# Patient Record
Sex: MALE | Race: WHITE | NOT HISPANIC OR LATINO | URBAN - METROPOLITAN AREA
[De-identification: names, ages, dates, MRNs, and addresses within clinical notes are randomized per-mention and may not be internally consistent; named-entity substitution may affect disease eponyms.]

---

## 2018-07-17 ENCOUNTER — EMERGENCY (EMERGENCY)
Facility: HOSPITAL | Age: 83
LOS: 0 days | Discharge: HOME | End: 2018-07-17
Attending: EMERGENCY MEDICINE | Admitting: EMERGENCY MEDICINE

## 2018-07-17 VITALS
TEMPERATURE: 96 F | SYSTOLIC BLOOD PRESSURE: 146 MMHG | RESPIRATION RATE: 18 BRPM | DIASTOLIC BLOOD PRESSURE: 72 MMHG | HEART RATE: 116 BPM | OXYGEN SATURATION: 96 %

## 2018-07-17 VITALS
TEMPERATURE: 98 F | HEART RATE: 85 BPM | RESPIRATION RATE: 18 BRPM | DIASTOLIC BLOOD PRESSURE: 59 MMHG | OXYGEN SATURATION: 98 % | SYSTOLIC BLOOD PRESSURE: 116 MMHG

## 2018-07-17 DIAGNOSIS — E11.9 TYPE 2 DIABETES MELLITUS WITHOUT COMPLICATIONS: ICD-10-CM

## 2018-07-17 DIAGNOSIS — I10 ESSENTIAL (PRIMARY) HYPERTENSION: ICD-10-CM

## 2018-07-17 DIAGNOSIS — R04.0 EPISTAXIS: ICD-10-CM

## 2018-07-17 DIAGNOSIS — R00.0 TACHYCARDIA, UNSPECIFIED: ICD-10-CM

## 2018-07-17 LAB
ANION GAP SERPL CALC-SCNC: 18 MMOL/L — HIGH (ref 7–14)
APTT BLD: 27.9 SEC — SIGNIFICANT CHANGE UP (ref 27–39.2)
BUN SERPL-MCNC: 29 MG/DL — HIGH (ref 10–20)
CALCIUM SERPL-MCNC: 8.7 MG/DL — SIGNIFICANT CHANGE UP (ref 8.5–10.1)
CHLORIDE SERPL-SCNC: 100 MMOL/L — SIGNIFICANT CHANGE UP (ref 98–110)
CO2 SERPL-SCNC: 22 MMOL/L — SIGNIFICANT CHANGE UP (ref 17–32)
CREAT SERPL-MCNC: 1.1 MG/DL — SIGNIFICANT CHANGE UP (ref 0.7–1.5)
GLUCOSE SERPL-MCNC: 165 MG/DL — HIGH (ref 70–99)
HCT VFR BLD CALC: 43.8 % — SIGNIFICANT CHANGE UP (ref 42–52)
HGB BLD-MCNC: 14.1 G/DL — SIGNIFICANT CHANGE UP (ref 14–18)
INR BLD: 1.15 RATIO — SIGNIFICANT CHANGE UP (ref 0.65–1.3)
MCHC RBC-ENTMCNC: 31 PG — SIGNIFICANT CHANGE UP (ref 27–31)
MCHC RBC-ENTMCNC: 32.2 G/DL — SIGNIFICANT CHANGE UP (ref 32–37)
MCV RBC AUTO: 96.3 FL — HIGH (ref 80–94)
NRBC # BLD: 0 /100 WBCS — SIGNIFICANT CHANGE UP (ref 0–0)
PLATELET # BLD AUTO: 182 K/UL — SIGNIFICANT CHANGE UP (ref 130–400)
POTASSIUM SERPL-MCNC: 4.8 MMOL/L — SIGNIFICANT CHANGE UP (ref 3.5–5)
POTASSIUM SERPL-SCNC: 4.8 MMOL/L — SIGNIFICANT CHANGE UP (ref 3.5–5)
PROTHROM AB SERPL-ACNC: 12.4 SEC — SIGNIFICANT CHANGE UP (ref 9.95–12.87)
RBC # BLD: 4.55 M/UL — LOW (ref 4.7–6.1)
RBC # FLD: 14.8 % — HIGH (ref 11.5–14.5)
SODIUM SERPL-SCNC: 140 MMOL/L — SIGNIFICANT CHANGE UP (ref 135–146)
WBC # BLD: 10.7 K/UL — SIGNIFICANT CHANGE UP (ref 4.8–10.8)
WBC # FLD AUTO: 10.7 K/UL — SIGNIFICANT CHANGE UP (ref 4.8–10.8)

## 2018-07-17 RX ORDER — SODIUM CHLORIDE 9 MG/ML
500 INJECTION INTRAMUSCULAR; INTRAVENOUS; SUBCUTANEOUS ONCE
Qty: 0 | Refills: 0 | Status: COMPLETED | OUTPATIENT
Start: 2018-07-17 | End: 2018-07-17

## 2018-07-17 RX ORDER — SODIUM CHLORIDE 9 MG/ML
3 INJECTION INTRAMUSCULAR; INTRAVENOUS; SUBCUTANEOUS EVERY 8 HOURS
Qty: 0 | Refills: 0 | Status: DISCONTINUED | OUTPATIENT
Start: 2018-07-17 | End: 2018-07-17

## 2018-07-17 RX ADMIN — SODIUM CHLORIDE 1000 MILLILITER(S): 9 INJECTION INTRAMUSCULAR; INTRAVENOUS; SUBCUTANEOUS at 19:31

## 2018-07-17 RX ADMIN — SODIUM CHLORIDE 3 MILLILITER(S): 9 INJECTION INTRAMUSCULAR; INTRAVENOUS; SUBCUTANEOUS at 21:10

## 2018-07-17 NOTE — ED PROVIDER NOTE - PHYSICAL EXAMINATION
CONST: Well appearing in NAD  EYES: PERRL, EOMI, Sclera and conjunctiva clear.   ENT: No active bleeding but fresh clots in left nare. No bleeding post OP  NECK: Non-tender, no meningeal signs  CARD: Normal S1 S2; Normal rate and rhythm  RESP: Equal BS B/L, No wheezes, rhonchi or rales. No distress  GI: Soft, non-tender, non-distended.  MS: Normal ROM in all extremities. No midline spinal tenderness.  SKIN: Warm, dry, no acute rashes. Good turgor  NEURO: A&Ox3, No focal deficits. Strength 5/5 with no sensory deficits. Steady gait

## 2018-07-17 NOTE — ED PROVIDER NOTE - NS ED ROS FT
CONST: No fever, chills or bodyaches  EYES: No pain, redness, drainage or visual changes.  CARD: No chest pain, palpitations  RESP: No SOB, cough, hemoptysis. No hx of asthma or COPD  GI: No abdominal pain, N/V/D  MS: No joint pain, back pain or extremity pain/injury  SKIN: No rashes  NEURO: No headache, dizziness, paresthesias or LOC

## 2018-07-17 NOTE — ED PROVIDER NOTE - ATTENDING CONTRIBUTION TO CARE
83M PMG dm, htn, ASA daily, p/w epistaxis. Pt had impacted infected molar removed today L sided by oral surgeon and was told would have possible epistaxis after due to sinus involvement of infection. epistaxis resolved in ED after pressure. no dizziness, cp, sob, palp, LOC. No abnl bruising, bleeding anywhere else. no fever, chills, n/v. no ha, numbness, weakness. no blood thinners. on exam, AFVSS, well deon nad, ncat, eomi, perrla, mmm, dried blood in L nare, no blood in post OP, lctab, rrr nl s1s2 no mrg, abd soft ntnd, aaox3, no focal deficits, no le edema or calf ttp, a/p; Epistaxis s/p dental procedure, pt was tachycardic on arrival on ASA, will check cbc, lytes, and get dental consult, bleeding resolved.

## 2018-07-17 NOTE — CONSULT NOTE ADULT - SUBJECTIVE AND OBJECTIVE BOX
Patient is a 83y old  Male who presents with a chief complaint of a nose bleed    HPI: Extraction of upper left molar completed at 14:00 by a private dentist, patient stated he started bleeding from his nose and was told to come to the hospital for evaluation.      PAST MEDICAL & SURGICAL HISTORY:  HTN (hypertension)    (   ) heart valve replacement  (   ) joint replacement  (   ) pregnancy    MEDICATIONS  (STANDING):  sodium chloride 0.9% Bolus 500 milliLiter(s) IV Bolus once  sodium chloride 0.9% lock flush 3 milliLiter(s) IV Push every 8 hours    MEDICATIONS  (PRN):      REVIEW OF SYSTEMS      General:	    Skin/Breast:  	  Ophthalmologic:  	  ENMT:	    Respiratory and Thorax:  	  Cardiovascular:	    Gastrointestinal:	    Genitourinary:	    Musculoskeletal:	    Neurological:	    Psychiatric:	    Hematology/Lymphatics:	    Endocrine:	    Allergic/Immunologic:	    Allergies    No Known Allergies    Intolerances        FAMILY HISTORY:      *SOCIAL HISTORY: (   ) Tobacco; (   ) ETOH    Vital Signs Last 24 Hrs  T(C): 35.8 (17 Jul 2018 18:34), Max: 35.8 (17 Jul 2018 18:34)  T(F): 96.4 (17 Jul 2018 18:34), Max: 96.4 (17 Jul 2018 18:34)  HR: 116 (17 Jul 2018 18:34) (116 - 116)  BP: 146/72 (17 Jul 2018 18:34) (146/72 - 146/72)  BP(mean): --  RR: 18 (17 Jul 2018 18:34) (18 - 18)  SpO2: 96% (17 Jul 2018 18:34) (96% - 96%)    LABS:                  Last Dental Visit: << 7/17/2018 >>    EOE:  TMJ (-   ) clicks                     ( -  ) pops                     (  - ) crepitus             Mandible <<FROM>>             Facial bones and MOM <<grossly intact>>             (  - ) trismus             (   -) lymphadenopathy             (  - ) swelling             (  - ) asymmetry             (  - ) palpation             (  - ) dyspnea             (  - ) dysphagia             (  - ) loss of consciousness    IOE:  <<permanent>> dentition:            <<multiple missing teeth>>             Dentition Present << Edentulous >>                     Mobility <<None  >>                     Caries <<No caries noted  >>                hard/soft palate:  (   ) palatal torus, <<No pathology noted>>            tongue/FOM <<No pathology noted>>            labial/buccal mucosa <<No pathology noted>>           (  - ) percussion           (   -) palpation           (  - ) swelling            (  - ) abscess           (  - ) sinus tract    *DENTAL RADIOGRAPHS:None    RADIOLOGY & ADDITIONAL STUDIES:None    *ASSESSMENT: Extraoral exam shows patient is no longer bleeding from nose with no abnormal findings. Intraoral exam shows extraction site of left Maxillary Molar area sutured appropriately. No palpation sensitivity in the area. Possible perforation of the Maxillary Sinus when extraction of Maxillary Left Molar was completed could cause the nose bleed.     *PLAN: Patient is currently stable and would recommend antibiotic treatment.    PROCEDURE:   Verbal and written consent given.  Anesthesia: <<  None  >>   Treatment: <<Extraoral/Intraoral Exam    >>     RECOMMENDATIONS:  1) <<Augmentin and Afrin Nasal Spray; Follow-up with Oral Surgeon who performed tooth extraction   >>  2) Dental F/U with outpatient dentist for comprehensive dental care.   3) If any difficulty swallowing/breathing, fever occur, return to ER.  Diallo Cole DDS, [facility] [phone]

## 2018-07-17 NOTE — ED PROVIDER NOTE - MEDICAL DECISION MAKING DETAILS
dental evaluated, ok for outpt f/u, bleeding stopped, labs wnl, repeat vitals wnl, pt feels well  will dc home w outpt f/u oral surgeon this week, strict return precautions provided.

## 2019-08-22 PROBLEM — I10 ESSENTIAL (PRIMARY) HYPERTENSION: Chronic | Status: ACTIVE | Noted: 2018-07-17

## 2019-09-10 NOTE — ASU PATIENT PROFILE, ADULT - PMH
Abnormal cholesterol test    Acquired cataract    History of diabetic neuropathy  IDDM  HTN (hypertension)    Kidney stones

## 2019-09-11 ENCOUNTER — OUTPATIENT (OUTPATIENT)
Dept: OUTPATIENT SERVICES | Facility: HOSPITAL | Age: 84
LOS: 1 days | Discharge: HOME | End: 2019-09-11

## 2019-09-11 VITALS — SYSTOLIC BLOOD PRESSURE: 140 MMHG | RESPIRATION RATE: 17 BRPM | DIASTOLIC BLOOD PRESSURE: 74 MMHG | HEART RATE: 67 BPM

## 2019-09-11 VITALS
HEIGHT: 69 IN | HEART RATE: 71 BPM | DIASTOLIC BLOOD PRESSURE: 70 MMHG | SYSTOLIC BLOOD PRESSURE: 147 MMHG | TEMPERATURE: 97 F | WEIGHT: 175.05 LBS | OXYGEN SATURATION: 99 % | RESPIRATION RATE: 17 BRPM

## 2019-09-11 DIAGNOSIS — Z98.890 OTHER SPECIFIED POSTPROCEDURAL STATES: Chronic | ICD-10-CM

## 2019-09-11 LAB
GLUCOSE BLDC GLUCOMTR-MCNC: 70 MG/DL — SIGNIFICANT CHANGE UP (ref 70–99)
GLUCOSE BLDC GLUCOMTR-MCNC: 86 MG/DL — SIGNIFICANT CHANGE UP (ref 70–99)
GLUCOSE BLDC GLUCOMTR-MCNC: 88 MG/DL — SIGNIFICANT CHANGE UP (ref 70–99)

## 2019-09-11 RX ORDER — SODIUM CHLORIDE 9 MG/ML
3 INJECTION INTRAMUSCULAR; INTRAVENOUS; SUBCUTANEOUS ONCE
Refills: 0 | Status: DISCONTINUED | OUTPATIENT
Start: 2019-09-11 | End: 2019-09-11

## 2019-09-11 RX ORDER — SIMVASTATIN 20 MG/1
1 TABLET, FILM COATED ORAL
Qty: 0 | Refills: 0 | DISCHARGE

## 2019-09-11 RX ORDER — METFORMIN HYDROCHLORIDE 850 MG/1
1 TABLET ORAL
Qty: 0 | Refills: 0 | DISCHARGE

## 2019-09-11 RX ORDER — SODIUM CHLORIDE 9 MG/ML
1000 INJECTION, SOLUTION INTRAVENOUS
Refills: 0 | Status: DISCONTINUED | OUTPATIENT
Start: 2019-09-11 | End: 2019-09-11

## 2019-09-11 RX ORDER — SEMAGLUTIDE 0.68 MG/ML
0 INJECTION, SOLUTION SUBCUTANEOUS
Qty: 0 | Refills: 0 | DISCHARGE

## 2019-09-11 RX ORDER — DAPAGLIFLOZIN 10 MG/1
1 TABLET, FILM COATED ORAL
Qty: 0 | Refills: 0 | DISCHARGE

## 2019-09-11 RX ORDER — IRBESARTAN 75 MG/1
1 TABLET ORAL
Qty: 0 | Refills: 0 | DISCHARGE

## 2019-09-11 RX ORDER — LEVOTHYROXINE SODIUM 125 MCG
1 TABLET ORAL
Qty: 0 | Refills: 0 | DISCHARGE

## 2019-09-11 RX ORDER — INSULIN DEGLUDEC 100 U/ML
0 INJECTION, SOLUTION SUBCUTANEOUS
Qty: 0 | Refills: 0 | DISCHARGE

## 2019-09-11 RX ORDER — ASPIRIN/CALCIUM CARB/MAGNESIUM 324 MG
1 TABLET ORAL
Qty: 0 | Refills: 0 | DISCHARGE

## 2019-09-11 NOTE — CHART NOTE - NSCHARTNOTEFT_GEN_A_CORE
PACU ANESTHESIA ADMISSION NOTE      Procedure:   Post op diagnosis:      ____  Intubated  TV:______       Rate: ______      FiO2: ______    __x__  Patent Airway    __x__  Full return of protective reflexes    _x___  Full recovery from anesthesia / back to baseline status    Vitals:  T(C): 36.2 (09-11-19 @ 12:34), Max: 36.2 (09-11-19 @ 11:12)  HR: 71 (09-11-19 @ 12:34) (71 - 71)  BP: 147/70 (09-11-19 @ 12:34) (147/70 - 147/70)  RR: 17 (09-11-19 @ 12:34) (17 - 17)  SpO2: 99% (09-11-19 @ 12:34) (99% - 99%)    Mental Status:  ___x_ Awake   ___x__ Alert   _____ Drowsy   _____ Sedated    Nausea/Vomiting:  ____ NO  __x____Yes,   See Post - Op Orders          Pain Scale (0-10):  _____    Treatment: ____ None    _x___ See Post - Op/PCA Orders    Post - Operative Fluids:   ____ Oral   _x___ See Post - Op Orders    Plan: Discharge:   _x__Home       _____Floor     _____Critical Care    _____  Other:_________________    Comments: uneventful anesthesia course no complications. VItals stable. Pt transferred to PACUx

## 2019-09-11 NOTE — ASU PREOP CHECKLIST - SELECT TESTS ORDERED
POCT Blood Glucose/fs = 70 at 1045... repeat at 1128 =/Sickle Cell (black patients 3mos-10yr) Sickle Cell (black patients 3mos-10yr)/fs = 70 at 1045... repeat at 1128 = 88/POCT Blood Glucose

## 2019-09-11 NOTE — PRE-ANESTHESIA EVALUATION ADULT - NSANTHPMHFT_GEN_ALL_CORE
Chart reviewed, pt interviewed and examined.  DM, took 15u Insulin am today, FS 70mg/dl, had  iv D5W, repeat FS 88mg/dl.

## 2019-09-30 DIAGNOSIS — H25.12 AGE-RELATED NUCLEAR CATARACT, LEFT EYE: ICD-10-CM

## 2019-09-30 DIAGNOSIS — Z79.84 LONG TERM (CURRENT) USE OF ORAL HYPOGLYCEMIC DRUGS: ICD-10-CM

## 2019-09-30 DIAGNOSIS — Z79.82 LONG TERM (CURRENT) USE OF ASPIRIN: ICD-10-CM

## 2019-09-30 DIAGNOSIS — E11.9 TYPE 2 DIABETES MELLITUS WITHOUT COMPLICATIONS: ICD-10-CM

## 2019-09-30 DIAGNOSIS — I10 ESSENTIAL (PRIMARY) HYPERTENSION: ICD-10-CM

## 2021-08-28 ENCOUNTER — EMERGENCY (EMERGENCY)
Facility: HOSPITAL | Age: 86
LOS: 0 days | Discharge: HOME | End: 2021-08-28
Attending: EMERGENCY MEDICINE | Admitting: EMERGENCY MEDICINE
Payer: MEDICARE

## 2021-08-28 VITALS
TEMPERATURE: 98 F | SYSTOLIC BLOOD PRESSURE: 139 MMHG | RESPIRATION RATE: 16 BRPM | HEIGHT: 69 IN | WEIGHT: 174.83 LBS | HEART RATE: 91 BPM | DIASTOLIC BLOOD PRESSURE: 68 MMHG | OXYGEN SATURATION: 98 %

## 2021-08-28 DIAGNOSIS — Z98.890 OTHER SPECIFIED POSTPROCEDURAL STATES: Chronic | ICD-10-CM

## 2021-08-28 DIAGNOSIS — Z79.890 HORMONE REPLACEMENT THERAPY: ICD-10-CM

## 2021-08-28 DIAGNOSIS — I10 ESSENTIAL (PRIMARY) HYPERTENSION: ICD-10-CM

## 2021-08-28 DIAGNOSIS — Z87.442 PERSONAL HISTORY OF URINARY CALCULI: ICD-10-CM

## 2021-08-28 DIAGNOSIS — M79.661 PAIN IN RIGHT LOWER LEG: ICD-10-CM

## 2021-08-28 DIAGNOSIS — Z79.82 LONG TERM (CURRENT) USE OF ASPIRIN: ICD-10-CM

## 2021-08-28 DIAGNOSIS — Z79.84 LONG TERM (CURRENT) USE OF ORAL HYPOGLYCEMIC DRUGS: ICD-10-CM

## 2021-08-28 DIAGNOSIS — E11.40 TYPE 2 DIABETES MELLITUS WITH DIABETIC NEUROPATHY, UNSPECIFIED: ICD-10-CM

## 2021-08-28 PROBLEM — Z86.39 PERSONAL HISTORY OF OTHER ENDOCRINE, NUTRITIONAL AND METABOLIC DISEASE: Chronic | Status: ACTIVE | Noted: 2019-09-11

## 2021-08-28 PROBLEM — H26.9 UNSPECIFIED CATARACT: Chronic | Status: ACTIVE | Noted: 2019-09-11

## 2021-08-28 PROBLEM — N20.0 CALCULUS OF KIDNEY: Chronic | Status: ACTIVE | Noted: 2019-09-11

## 2021-08-28 PROBLEM — E78.9 DISORDER OF LIPOPROTEIN METABOLISM, UNSPECIFIED: Chronic | Status: ACTIVE | Noted: 2019-09-11

## 2021-08-28 LAB
ALBUMIN SERPL ELPH-MCNC: 4.1 G/DL — SIGNIFICANT CHANGE UP (ref 3.5–5.2)
ALP SERPL-CCNC: 75 U/L — SIGNIFICANT CHANGE UP (ref 30–115)
ALT FLD-CCNC: 12 U/L — SIGNIFICANT CHANGE UP (ref 0–41)
ANION GAP SERPL CALC-SCNC: 9 MMOL/L — SIGNIFICANT CHANGE UP (ref 7–14)
APTT BLD: 34 SEC — SIGNIFICANT CHANGE UP (ref 27–39.2)
AST SERPL-CCNC: 15 U/L — SIGNIFICANT CHANGE UP (ref 0–41)
BASOPHILS # BLD AUTO: 0.05 K/UL — SIGNIFICANT CHANGE UP (ref 0–0.2)
BASOPHILS NFR BLD AUTO: 0.6 % — SIGNIFICANT CHANGE UP (ref 0–1)
BILIRUB SERPL-MCNC: 0.5 MG/DL — SIGNIFICANT CHANGE UP (ref 0.2–1.2)
BUN SERPL-MCNC: 20 MG/DL — SIGNIFICANT CHANGE UP (ref 10–20)
CALCIUM SERPL-MCNC: 9.4 MG/DL — SIGNIFICANT CHANGE UP (ref 8.5–10.1)
CHLORIDE SERPL-SCNC: 102 MMOL/L — SIGNIFICANT CHANGE UP (ref 98–110)
CO2 SERPL-SCNC: 28 MMOL/L — SIGNIFICANT CHANGE UP (ref 17–32)
CREAT SERPL-MCNC: 1 MG/DL — SIGNIFICANT CHANGE UP (ref 0.7–1.5)
EOSINOPHIL # BLD AUTO: 0.17 K/UL — SIGNIFICANT CHANGE UP (ref 0–0.7)
EOSINOPHIL NFR BLD AUTO: 2 % — SIGNIFICANT CHANGE UP (ref 0–8)
GLUCOSE SERPL-MCNC: 81 MG/DL — SIGNIFICANT CHANGE UP (ref 70–99)
HCT VFR BLD CALC: 44.7 % — SIGNIFICANT CHANGE UP (ref 42–52)
HGB BLD-MCNC: 14.8 G/DL — SIGNIFICANT CHANGE UP (ref 14–18)
IMM GRANULOCYTES NFR BLD AUTO: 0.5 % — HIGH (ref 0.1–0.3)
INR BLD: 1.04 RATIO — SIGNIFICANT CHANGE UP (ref 0.65–1.3)
LYMPHOCYTES # BLD AUTO: 1.9 K/UL — SIGNIFICANT CHANGE UP (ref 1.2–3.4)
LYMPHOCYTES # BLD AUTO: 22.8 % — SIGNIFICANT CHANGE UP (ref 20.5–51.1)
MCHC RBC-ENTMCNC: 31.6 PG — HIGH (ref 27–31)
MCHC RBC-ENTMCNC: 33.1 G/DL — SIGNIFICANT CHANGE UP (ref 32–37)
MCV RBC AUTO: 95.5 FL — HIGH (ref 80–94)
MONOCYTES # BLD AUTO: 1.04 K/UL — HIGH (ref 0.1–0.6)
MONOCYTES NFR BLD AUTO: 12.5 % — HIGH (ref 1.7–9.3)
NEUTROPHILS # BLD AUTO: 5.12 K/UL — SIGNIFICANT CHANGE UP (ref 1.4–6.5)
NEUTROPHILS NFR BLD AUTO: 61.6 % — SIGNIFICANT CHANGE UP (ref 42.2–75.2)
NRBC # BLD: 0 /100 WBCS — SIGNIFICANT CHANGE UP (ref 0–0)
PLATELET # BLD AUTO: 219 K/UL — SIGNIFICANT CHANGE UP (ref 130–400)
POTASSIUM SERPL-MCNC: 4.5 MMOL/L — SIGNIFICANT CHANGE UP (ref 3.5–5)
POTASSIUM SERPL-SCNC: 4.5 MMOL/L — SIGNIFICANT CHANGE UP (ref 3.5–5)
PROT SERPL-MCNC: 6.8 G/DL — SIGNIFICANT CHANGE UP (ref 6–8)
PROTHROM AB SERPL-ACNC: 11.9 SEC — SIGNIFICANT CHANGE UP (ref 9.95–12.87)
RBC # BLD: 4.68 M/UL — LOW (ref 4.7–6.1)
RBC # FLD: 16.1 % — HIGH (ref 11.5–14.5)
SODIUM SERPL-SCNC: 139 MMOL/L — SIGNIFICANT CHANGE UP (ref 135–146)
WBC # BLD: 8.32 K/UL — SIGNIFICANT CHANGE UP (ref 4.8–10.8)
WBC # FLD AUTO: 8.32 K/UL — SIGNIFICANT CHANGE UP (ref 4.8–10.8)

## 2021-08-28 PROCEDURE — 93925 LOWER EXTREMITY STUDY: CPT | Mod: 26

## 2021-08-28 PROCEDURE — 99284 EMERGENCY DEPT VISIT MOD MDM: CPT

## 2021-08-28 RX ORDER — GABAPENTIN 400 MG/1
1 CAPSULE ORAL
Qty: 90 | Refills: 0
Start: 2021-08-28 | End: 2021-09-26

## 2021-08-28 NOTE — ED ADULT TRIAGE NOTE - CHIEF COMPLAINT QUOTE
Pt c/o right foot pain x2 weeks, worsening overnight & waking him from sleep; sent in from urgent care for a weak pulse. Pt reports he has sensation, but feels tingling.

## 2021-08-28 NOTE — ED PROVIDER NOTE - ATTENDING CONTRIBUTION TO CARE
85 yo m with pmh of htn, iddm, sent by [facility] for R foot pain.  as per document, pt had decreased pulse of the RLE so sent in to ED for evaluation.  pt admits has been having RLE/R foot pain worse at night for the last few months.  however over the last few weeks, he is also having pain during the day and during activity.  denies trauma.  exam: nad, ncat, perrl, eomi, mmm, rrr, ctab, abd soft, nt,nd aox3, b/l LE and feet warm, nonpalpable pulses, dopplerable R PT, FROM all joints, nontender LE imp: pt with R foot/RLE pain at rest and during activity, ?claudication, unable to palpate pulses, will send for arterial duplex and vascular consult

## 2021-08-28 NOTE — ED PROVIDER NOTE - CARE PROVIDER_API CALL
Hanna Andrea (MD)  Surgery  [address] [place] [address]  Phone: [phone]  Fax: [phone]  Follow Up Time: Urgent

## 2021-08-28 NOTE — ED PROVIDER NOTE - PROGRESS NOTE DETAILS
dc: spoke with vascular consultant at [phone]. Will evaluate pt for deminished peripheral pulses. Arterial duplex studies have been ordered. Pt comfortable, VSS, feet warm with full active and passive movment. dc: preoperative labs obtained. Pt to be dc'd with vascular outpt f/u monday plan for angiogram dc: spoke with vascular consultant at [phone]. Will evaluate pt for deminished peripheral pulses. Arterial duplex studies have been ordered. Pt comfortable, VSS, feet warm with full active and passive movement.

## 2021-08-28 NOTE — ED PROVIDER NOTE - OBJECTIVE STATEMENT
86 y.o. M, HTN, Kidney stones sent in by Dr. Wheeler for evaluation of RLE pain.  Is on Lyrica for neuropathy. Pain is worsened at night. No trauma, swallowing, redness, bruising, lacerations. Sent in specifically for concern of diminished pulses. No hx of DVT or PEs.

## 2021-08-28 NOTE — CONSULT NOTE ADULT - SUBJECTIVE AND OBJECTIVE BOX
VASCULAR SURGERY CONSULT NOTE      HPI: Patient is an 87 y/o Male with PMH Of abnormal cholesterol test, acquired cataract, hx of diabetic neuropathy (IDDM), HTN, and kidney stones sent to ED by [facility] for diminished RLE pulses and increased pain. Patient fell about 2 months ago on right side and had a fractured rib. Pain in leg started at that time and was initially only present at night and on activity but has progressed to constant pain even during rest. Patient states that pain is sharp and not burning and travels up and down leg but is not present in the back. Pain is only present in the right leg and not left. Has had no vascular procedures in the past or any ulcers or lower extremity issues besides diabetic neuropathy. Patient denies any chest pain, SOB, or any other concerning problems.         PAST MEDICAL & SURGICAL HISTORY:  HTN (hypertension)    History of diabetic neuropathy  IDDM    Acquired cataract    Abnormal cholesterol test    Kidney stones    History of surgery on arm  ORIF LEFT ARM      No Known Allergies    Home Medications:  Aspir 81 oral delayed release tablet: 1 tab(s) orally once a day (11 Sep 2019 11:35)  Avapro 75 mg oral tablet: 1 tab(s) orally once a day (11 Sep 2019 11:35)  Farxiga 5 mg oral tablet: 1 tab(s) orally once a day (11 Sep 2019 11:35)  irbesartan 75 mg oral tablet: 1 tab(s) orally once a day (11 Sep 2019 11:35)  Lyrica 20 mg/mL oral solution: 2.5 milliliter(s) orally 3 times a day (11 Sep 2019 11:35)  metFORMIN 500 mg oral tablet: 1 tab(s) orally 2 times a day (11 Sep 2019 11:35)  Ozempic (0.25 mg or 0.5 mg dose) 2 mg/1.5 mL subcutaneous solution:  (11 Sep 2019 11:35)  simvastatin 5 mg oral tablet: 1 tab(s) orally once a day (at bedtime) (11 Sep 2019 11:35)  Synthroid 25 mcg (0.025 mg) oral tablet: 1 tab(s) orally once a day (11 Sep 2019 11:35)  Tresiba 100 units/mL subcutaneous solution: 1/2 DOSE  (11 Sep 2019 11:35)    No permtinent family history of PVD    REVIEW OF SYSTEMS:  GENERAL:                                         negative  SKIN:                                                 negative  OPTHALMOLOGIC:                          negative  ENMT:                                               negative  RESPIRATORY AND THORAX:        negative  CARDIOVASCULAR:                         negative  GASTROINTESTINAL:                       negative  NEPHROLOGY:                                  negative  MUSCULOSKELETAL:                       negative  NEUROLOGIC:                                   negative  PSYCHIATRIC:                                    negative  HEMATOLOGY/LYMPHATICS:         negative  ENDOCRINE:                                     negative  ALLERGIC/IMMUNOLOGIC:            negative    12 point ROS otherwise normal except as stated in HPI    PHYSICAL EXAM  Vital Signs Last 24 Hrs  T(C): 36.5 (28 Aug 2021 11:13), Max: 36.5 (28 Aug 2021 11:13)  T(F): 97.7 (28 Aug 2021 11:13), Max: 97.7 (28 Aug 2021 11:13)  HR: 91 (28 Aug 2021 11:13) (91 - 91)  BP: 139/68 (28 Aug 2021 11:13) (139/68 - 139/68)  BP(mean): --  RR: 16 (28 Aug 2021 11:13) (16 - 16)  SpO2: 98% (28 Aug 2021 11:13) (98% - 98%)    Appearance: Normal	  HEENT:   Normal oral mucosa, PERRL, EOMI	  Neck: Supple, - JVD; Carotid Bruit   Cardiovascular: Normal S1 S2, No JVD, No murmurs,   Respiratory: Lungs clear to auscultation, No Rales, Rhonchi, Wheezing	  Gastrointestinal:  Soft, Non-tender, positive BS	  Skin: No rashes, No ecchymoses, No cyanosis  Extremities: Normal range of motion, No clubbing, cyanosis or edema  Vascular: Diminished RLE DP pulse (not palpable but very faint monophasic pulse on doppler). Nontender to palpation of right foot or right calf.  LLE pulses palpable.         PULSES:    Popliteal: palpable   Dorsal Pedal: Palpable on left. Very faint on doppler on right.   Posterior Tibial: Palpable on left. Dopplerable on right.   Capillary: greater than 2 seconds on right foot.     MEDICATIONS:   MEDICATIONS  (STANDING):    MEDICATIONS  (PRN):      LAB/STUDIES:                                IMAGING:        ASSESSMENT: Patient is an 87 y/o male with hx of acquired cataract, hx of diabetic neuropathy (IDDM), HTN, kidney stones and abnormal cholesterol test presented with diminished pulses and constant pain on   Vascular surgery consulted for    PLAN:   -   -   -   -   - Patient seen/examined or Plan Discussed with Fellow,    - Plan to be discussed with Attending,        VASCULAR TEAM SPECTRA: 4113 VASCULAR SURGERY CONSULT NOTE      HPI: Patient is an 87 y/o Male with PMH Of abnormal cholesterol test, acquired cataract, hx of diabetic neuropathy (IDDM), HTN, and kidney stones sent to ED by [facility] for diminished RLE pulses and increased pain. Patient fell about 2 months ago on right side and had a fractured rib. Pain in leg started at that time and was initially only present at night and on activity but has progressed to constant pain even during rest. Patient states that pain is sharp and not burning and travels up and down leg but is not present in the back. Pain is only present in the right leg and not left. Has had no vascular procedures in the past or any ulcers or lower extremity issues besides diabetic neuropathy. Patient denies any chest pain, SOB, or any other concerning problems.         PAST MEDICAL & SURGICAL HISTORY:  HTN (hypertension)    History of diabetic neuropathy  IDDM    Acquired cataract    Abnormal cholesterol test    Kidney stones    History of surgery on arm  ORIF LEFT ARM      No Known Allergies    Home Medications:  Aspir 81 oral delayed release tablet: 1 tab(s) orally once a day (11 Sep 2019 11:35)  Avapro 75 mg oral tablet: 1 tab(s) orally once a day (11 Sep 2019 11:35)  Farxiga 5 mg oral tablet: 1 tab(s) orally once a day (11 Sep 2019 11:35)  irbesartan 75 mg oral tablet: 1 tab(s) orally once a day (11 Sep 2019 11:35)  Lyrica 20 mg/mL oral solution: 2.5 milliliter(s) orally 3 times a day (11 Sep 2019 11:35)  metFORMIN 500 mg oral tablet: 1 tab(s) orally 2 times a day (11 Sep 2019 11:35)  Ozempic (0.25 mg or 0.5 mg dose) 2 mg/1.5 mL subcutaneous solution:  (11 Sep 2019 11:35)  simvastatin 5 mg oral tablet: 1 tab(s) orally once a day (at bedtime) (11 Sep 2019 11:35)  Synthroid 25 mcg (0.025 mg) oral tablet: 1 tab(s) orally once a day (11 Sep 2019 11:35)  Tresiba 100 units/mL subcutaneous solution: 1/2 DOSE  (11 Sep 2019 11:35)    No permtinent family history of PVD    REVIEW OF SYSTEMS:  GENERAL:                                         negative  SKIN:                                                 negative  OPTHALMOLOGIC:                          negative  ENMT:                                               negative  RESPIRATORY AND THORAX:        negative  CARDIOVASCULAR:                         negative  GASTROINTESTINAL:                       negative  NEPHROLOGY:                                  negative  MUSCULOSKELETAL:                       negative  NEUROLOGIC:                                   negative  PSYCHIATRIC:                                    negative  HEMATOLOGY/LYMPHATICS:         negative  ENDOCRINE:                                     negative  ALLERGIC/IMMUNOLOGIC:            negative    12 point ROS otherwise normal except as stated in HPI    PHYSICAL EXAM  Vital Signs Last 24 Hrs  T(C): 36.5 (28 Aug 2021 11:13), Max: 36.5 (28 Aug 2021 11:13)  T(F): 97.7 (28 Aug 2021 11:13), Max: 97.7 (28 Aug 2021 11:13)  HR: 91 (28 Aug 2021 11:13) (91 - 91)  BP: 139/68 (28 Aug 2021 11:13) (139/68 - 139/68)  BP(mean): --  RR: 16 (28 Aug 2021 11:13) (16 - 16)  SpO2: 98% (28 Aug 2021 11:13) (98% - 98%)    Appearance: Normal	  HEENT:   Normal oral mucosa, PERRL, EOMI	  Neck: Supple, - JVD; Carotid Bruit   Cardiovascular: Normal S1 S2, No JVD, No murmurs,   Respiratory: Lungs clear to auscultation, No Rales, Rhonchi, Wheezing	  Gastrointestinal:  Soft, Non-tender, positive BS	  Skin: No rashes, No ecchymoses, No cyanosis  Extremities: Normal range of motion, No clubbing, cyanosis or edema  Vascular: Diminished RLE DP pulse (not palpable but very faint monophasic pulse on doppler). Nontender to palpation of right foot or right calf.  LLE pulses palpable.         PULSES:    Popliteal: palpable   Dorsal Pedal: Palpable on left. Very faint on doppler on right.   Posterior Tibial: Palpable on left. Dopplerable on right.   Capillary: greater than 2 seconds on right foot.     MEDICATIONS:   MEDICATIONS  (STANDING):    MEDICATIONS  (PRN):      LAB/STUDIES:                                IMAGING:        ASSESSMENT: Patient is an 87 y/o male with hx of acquired cataract, hx of diabetic neuropathy (IDDM), HTN, kidney stones and abnormal cholesterol test presented with diminished pulses and constant pain of RLE even at rest that started 2 months ago post fall (no injuries besides a broken rib at time of fall).   Vascular surgery consulted for diminished RLE pulses and RLE pain.     PLAN:   -   -   -   -   - Patient seen/examined with Fellow, Dr. Zafar   - Plan to be discussed with Attending, Dr. Andrea       VASCULAR TEAM SPECTRA: 0586 VASCULAR SURGERY CONSULT NOTE      HPI: Patient is an 85 y/o Male with PMH Of abnormal cholesterol test, acquired cataract, hx of diabetic neuropathy (IDDM), HTN, and kidney stones sent to ED by [facility] for diminished RLE pulses and increased pain. Patient fell about 2 months ago on right side and had a fractured rib. Pain in leg started at that time and was initially only present at night and on activity but has progressed to constant pain even during rest. Patient states that pain is sharp and not burning and travels up and down leg but is not present in the back. Pain is only present in the right leg and not left. Has had no vascular procedures in the past or any ulcers or lower extremity issues besides diabetic neuropathy. Patient denies any chest pain, SOB, or any other concerning problems.         PAST MEDICAL & SURGICAL HISTORY:  HTN (hypertension)    History of diabetic neuropathy  IDDM    Acquired cataract    Abnormal cholesterol test    Kidney stones    History of surgery on arm  ORIF LEFT ARM      No Known Allergies    Home Medications:  Aspir 81 oral delayed release tablet: 1 tab(s) orally once a day (11 Sep 2019 11:35)  Avapro 75 mg oral tablet: 1 tab(s) orally once a day (11 Sep 2019 11:35)  Farxiga 5 mg oral tablet: 1 tab(s) orally once a day (11 Sep 2019 11:35)  irbesartan 75 mg oral tablet: 1 tab(s) orally once a day (11 Sep 2019 11:35)  Lyrica 20 mg/mL oral solution: 2.5 milliliter(s) orally 3 times a day (11 Sep 2019 11:35)  metFORMIN 500 mg oral tablet: 1 tab(s) orally 2 times a day (11 Sep 2019 11:35)  Ozempic (0.25 mg or 0.5 mg dose) 2 mg/1.5 mL subcutaneous solution:  (11 Sep 2019 11:35)  simvastatin 5 mg oral tablet: 1 tab(s) orally once a day (at bedtime) (11 Sep 2019 11:35)  Synthroid 25 mcg (0.025 mg) oral tablet: 1 tab(s) orally once a day (11 Sep 2019 11:35)  Tresiba 100 units/mL subcutaneous solution: 1/2 DOSE  (11 Sep 2019 11:35)    No permtinent family history of PVD    REVIEW OF SYSTEMS:  GENERAL:                                         negative  SKIN:                                                 negative  OPTHALMOLOGIC:                          negative  ENMT:                                               negative  RESPIRATORY AND THORAX:        negative  CARDIOVASCULAR:                         negative  GASTROINTESTINAL:                       negative  NEPHROLOGY:                                  negative  MUSCULOSKELETAL:                       negative  NEUROLOGIC:                                   negative  PSYCHIATRIC:                                    negative  HEMATOLOGY/LYMPHATICS:         negative  ENDOCRINE:                                     negative  ALLERGIC/IMMUNOLOGIC:            negative    12 point ROS otherwise normal except as stated in HPI    PHYSICAL EXAM  Vital Signs Last 24 Hrs  T(C): 36.5 (28 Aug 2021 11:13), Max: 36.5 (28 Aug 2021 11:13)  T(F): 97.7 (28 Aug 2021 11:13), Max: 97.7 (28 Aug 2021 11:13)  HR: 91 (28 Aug 2021 11:13) (91 - 91)  BP: 139/68 (28 Aug 2021 11:13) (139/68 - 139/68)  BP(mean): --  RR: 16 (28 Aug 2021 11:13) (16 - 16)  SpO2: 98% (28 Aug 2021 11:13) (98% - 98%)    Appearance: Normal	  HEENT:   Normal oral mucosa, PERRL, EOMI	  Neck: Supple, - JVD; Carotid Bruit   Cardiovascular: Normal S1 S2, No JVD, No murmurs,   Respiratory: Lungs clear to auscultation, No Rales, Rhonchi, Wheezing	  Gastrointestinal:  Soft, Non-tender, positive BS	  Skin: No rashes, No ecchymoses, No cyanosis  Extremities: Normal range of motion, No clubbing, cyanosis or edema  Vascular: Diminished RLE DP pulse (not palpable but very faint monophasic pulse on doppler). Nontender to palpation of right foot or right calf.  LLE pulses palpable.         PULSES:    Popliteal: palpable   Dorsal Pedal: Palpable on left. Very faint on doppler on right.   Posterior Tibial: Palpable on left. Dopplerable on right.   Capillary: greater than 2 seconds on right foot.     MEDICATIONS:   MEDICATIONS  (STANDING):    MEDICATIONS  (PRN):      LAB/STUDIES:                                IMAGING:   Duplex has been done but not read

## 2021-08-28 NOTE — ED PROVIDER NOTE - CLINICAL SUMMARY MEDICAL DECISION MAKING FREE TEXT BOX
Pt with R leg/foot pain, likely claudication, no significant stenosis on arterial duplex, seen and evaluated by vascular and advised to f/u with clinic on monday

## 2021-08-28 NOTE — ED PROVIDER NOTE - PATIENT PORTAL LINK FT
You can access the FollowMyHealth Patient Portal offered by [facility] by registering at the following website: [email or web address] By joining [employer]’s FollowMyHealth portal, you will also be able to view your health information using other applications (apps) compatible with our system.

## 2021-08-28 NOTE — ED ADULT NURSE NOTE - OBJECTIVE STATEMENT
Pt sent in by [facility] for R foot pain/diminished pulses. Pt states he has been having increased pain to R foot that is waking him up out of his sleep. Pt went to [facility] who noted diminished pulses. R foot noted warm to touch. Full ROM noted. Denies cp/sob.

## 2021-08-28 NOTE — ED PROVIDER NOTE - PHYSICAL EXAMINATION
Physical Exam    Vital Signs: I have reviewed the initial vital signs.  Constitutional: well-nourished, appears stated age, no acute distress  Eyes: Conjunctiva pink, Sclera clear, PERRLA, EOMI, no ptosis, no entrapment, no racoon eyes.  Ears: No hemotypanum, TM intact, with cone of light visualiuzed.  Cardiovascular: S1 and S2, regular rate, regular rhythm, well-perfused extremities, radial pulses equal and 2+, calves nonttp, equal in size, pedal pulses deminished with dopplerable recognition   Respiratory: unlabored respiratory effort, speaking in full sentences, handling oral secretions,  clear to auscultation bilaterally no wheezing, rales and rhonchi  Integumentary: warm, dry, no rashes, lacerations,  Neurologic: awake, alert, cranial nerves II-XII grossly intact, extremities’ motor and sensory functions grossly intact, no nystagmus, tremors, fasiculationsm facial droop, no ataxia, no dysmetria

## 2021-08-28 NOTE — ED PROVIDER NOTE - NSFOLLOWUPINSTRUCTIONS_ED_ALL_ED_FT
Go to the vascular clinic on Monday. Call  the numbers on this discharge. Return to the ER if your symptoms worsen.    Leg Pain    WHAT YOU NEED TO KNOW:    Leg pain may be caused by a variety of health conditions. Your tests did not show any broken bones or blood clots.    DISCHARGE INSTRUCTIONS:    Return to the emergency department if:     You have a fever.      Your leg starts to swell.      Your leg pain gets worse.      You have numbness or tingling in your leg or toes.      You cannot put any weight on or move your leg.    Contact your healthcare provider if:     Your pain does not decrease, even after treatment.      You have questions or concerns about your condition or care.    Medicines:     NSAIDs, such as ibuprofen, help decrease swelling, pain, and fever. This medicine is available with or without a doctor's order. NSAIDs can cause stomach bleeding or kidney problems in certain people. If you take blood thinner medicine, always ask your healthcare provider if NSAIDs are safe for you. Always read the medicine label and follow directions.      Take your medicine as directed. Contact your healthcare provider if you think your medicine is not helping or if you have side effects. Tell him of her if you are allergic to any medicine. Keep a list of the medicines, vitamins, and herbs you take. Include the amounts, and when and why you take them. Bring the list or the pill bottles to follow-up visits. Carry your medicine list with you in case of an emergency.    Follow up with your healthcare provider as directed: You may need more tests to find the cause of your leg pain. You may need to see an orthopedic specialist or a physical therapist. Write down your questions so you remember to ask them during your visits.    Manage your leg pain:     Rest your injured leg so that it can heal. You may need an immobilizer, brace, or splint to limit the movement of your leg. You may need to avoid putting any weight on your leg for at least 48 hours. Return to normal activities as directed.      Ice the injury for 20 minutes every 4 hours for up to 24 hours, or as directed. Use an ice pack, or put crushed ice in a plastic bag. Cover it with a towel to protect your skin. Ice helps prevent tissue damage and decreases swelling and pain.      Elevate your injured leg above the level of your heart as often as you can. This will help decrease swelling and pain. If possible, prop your leg on pillows or blankets to keep the area elevated comfortably.       Use assistive devices as directed. You may need to use a cane or crutches. Assistive devices help decrease pain and pressure on your leg when you walk. Ask your healthcare provider for more information about assistive devices and how to use them correctly.      Maintain a healthy weight. Extra body weight can cause pressure and pain in your hip, knee, and ankle joints. Ask your healthcare provider how much you should weigh. Ask him to help you create a weight loss plan if you are overweight.         © Copyright [employer] 2019 All illustrations and images included in CareNotes are the copyrighted property of [employer] or [employer].

## 2021-08-28 NOTE — ED PROVIDER NOTE - NS ED ROS FT
Constitutional: (-) fever (-) chills  (-) lightheadedness   Eyes/ENT: (-) blurry vision, (-) epistaxis (-) rhinorrhea (-) nasal congestion  Cardiovascular: (-) chest pain, (-) syncope (-) palpitations   Respiratory: (-) cough, (-) shortness of breath (-) pleurisy   Gastrointestinal: (-) vomiting, (-) diarrhea (-) abdominal pain (-) nausea (-) anorexia  Musculoskeletal: (-) neck pain, (-) back pain, (-) joint pain (-) joint swelling (-) painful ROM (+) RLE pain   Integumentary: (-) rash, (-) edema (-) lacerations (-) pruritis   Neurological: (-) headache, (-) altered mental status (-) LOC (-) dizziness (-) paresthesias (-) gait abnormalities

## 2021-08-28 NOTE — ED PROVIDER NOTE - NSFOLLOWUPCLINICS_GEN_ALL_ED_FT
[facility]  [facility]  [address] [place] [address]  Phone: [phone]  Fax:

## 2021-08-28 NOTE — CONSULT NOTE ADULT - ASSESSMENT
ASSESSMENT: Patient is an 87 y/o male with hx of acquired cataract, hx of diabetic neuropathy (IDDM), HTN, kidney stones and abnormal cholesterol test presented with diminished pulses and constant pain of RLE even at rest that started 2 months ago post fall (no injuries besides a broken rib at time of fall).   Vascular surgery consulted for diminished RLE pulses and RLE pain.     PLAN:   - full set of labs by ED before discharge   - F/U outpatient with Dr. Andrea on 8/30.   - Elective angiogram - will be decided during office visit   - Patient seen/examined with Fellow, Dr. Zafar   - Plan to be discussed with Attending, Dr. Andrea       VASCULAR TEAM SPECTRA: [id number]

## 2021-08-28 NOTE — ED PROVIDER NOTE - NSICDXPASTMEDICALHX_GEN_ALL_CORE_FT
PAST MEDICAL HISTORY:  Abnormal cholesterol test     Acquired cataract     History of diabetic neuropathy IDDM    HTN (hypertension)     Kidney stones

## 2021-08-28 NOTE — ED ADULT NURSE NOTE - INTERVENTIONS DEFINITIONS
[place] to call system/Call bell, personal items and telephone within reach/Instruct patient to call for assistance/Room bathroom lighting operational/Non-slip footwear when patient is off stretcher/Physically safe environment: no spills, clutter or unnecessary equipment/Stretcher in lowest position, wheels locked, appropriate side rails in place

## 2021-11-15 PROBLEM — Z00.00 ENCOUNTER FOR PREVENTIVE HEALTH EXAMINATION: Status: ACTIVE | Noted: 2021-11-15

## 2021-11-22 ENCOUNTER — APPOINTMENT (OUTPATIENT)
Dept: VASCULAR SURGERY | Facility: CLINIC | Age: 86
End: 2021-11-22
Payer: MEDICARE

## 2021-11-22 VITALS
BODY MASS INDEX: 23.7 KG/M2 | HEART RATE: 77 BPM | DIASTOLIC BLOOD PRESSURE: 70 MMHG | HEIGHT: 69 IN | WEIGHT: 160 LBS | SYSTOLIC BLOOD PRESSURE: 120 MMHG | OXYGEN SATURATION: 97 % | TEMPERATURE: 97.6 F

## 2021-11-22 DIAGNOSIS — Z78.9 OTHER SPECIFIED HEALTH STATUS: ICD-10-CM

## 2021-11-22 DIAGNOSIS — Z86.39 PERSONAL HISTORY OF OTHER ENDOCRINE, NUTRITIONAL AND METABOLIC DISEASE: ICD-10-CM

## 2021-11-22 DIAGNOSIS — I70.213 ATHEROSCLEROSIS OF NATIVE ARTERIES OF EXTREMITIES WITH INTERMITTENT CLAUDICATION, BILATERAL LEGS: ICD-10-CM

## 2021-11-22 PROCEDURE — 99204 OFFICE O/P NEW MOD 45 MIN: CPT

## 2021-11-22 NOTE — HISTORY OF PRESENT ILLNESS
[FreeTextEntry1] : The patient is a long time diabetic who presents complaining of bilateral leg pain. The patient states he has pain with walking as well as with rest. 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 The patient denies Rest pain to his feet and he denies claudication symptoms. his walking is limited by his spinal stenosis.

## 2021-11-22 NOTE — ASSESSMENT
[FreeTextEntry1] : The patient is an 87 yo male with distal anterior tibal occusion on duplex who has a history of spinal stenosis and complains  of calf pain at rest, He denies rest pain to his foot and had no evidence of tissue loss. I recommend he start ASA 81 mg and continue with lipid lowering medication and follow up with pin management for his leg discomfort that seems to be arising from his lower back. I will see the patient back in my office in 6 months time for a repeat duplex and re evaluation. If the patient develops any tissue loss to follow up sooner.

## 2021-11-22 NOTE — END OF VISIT
[FreeTextEntry3] : 86 years old male with DM, non smoker, with recent back trauma and disk herniation. CC of right \par medial leg pain unrelated to position or walking. No tissue loss or rest pain.\par In the examination, he has normal capillary refill, palpable femoral and popliteal pulses on both sides.\par His DP is not palpable but on the left side he has weak PT.\par He has an arterial duplex last week in the hospital which shows diffuse moderate disease,\par and his arteries are patent except distal AT to DP bilaterally.\par \par As he does not have CLTI, his symptom is nor related to arterial insufficiency and we have a recent\par arterial duplex, we won't repeat the duplex today.\par \par Would recommend BMT and FU in 6 months for repeat arterial duplex.

## 2022-05-23 ENCOUNTER — APPOINTMENT (OUTPATIENT)
Dept: VASCULAR SURGERY | Facility: CLINIC | Age: 87
End: 2022-05-23

## 2024-11-11 NOTE — ED ADULT NURSE NOTE - NS ED NURSE RECORD ANOTHER VITAL SIGN
Health  Wellness Visit Note    Name: Jackelyn Kendrick  Clinic Number: [id number]  Physician: No ref. provider found  Diagnosis: No diagnosis found.  Past Medical History:   Diagnosis Date    Arthritis     Basal cell carcinoma     Bronchitis     seasonal    Cataract     Disorder of kidney and ureter     Diverticulitis     Dry eye syndrome     GERD (gastroesophageal reflux disease)     Hyperlipidemia     Hypertension     Hypothyroidism 07/19/2012    Obese     PONV (postoperative nausea and vomiting)     Thyroid disease      Visit Number: [id number]  Precautions: Standard, Previous LB Surgery, HTN, L RTC Repair, R Arthroscopic       1st PT visit:  05/16/2024  Year of care end date:  May 2025  Mindbody plan: 60--- 9 Months  Patient level: B    Time In: 9:00 AM  Time Out: 9: AM  Total Treatment Time: Minutes    [facility]  Handout on this week's wellness topic Sleep Positions provided along with a discussion on what it means, the benefits, and suggestions for practice.  Reviewed last week's topic of Sleep Requirements.      Subjective:   Patient reports she has been feeling good. She is really impressed with how her back has been feeling. She does her stretches daily and comes to Well[facility] once weekly. Outside of Wellness, patient does not go to the gym or exercises on her own. She plans to join a gym and go at least twice weekly soon. Patient does not ice at home.     Patient visited [facility]. She liked the cleanliness and options of group classes.     Objective:   Jackelyn completed therapeutic stretches (EIL, BOLIVAR) and the following MedX exercise machines: core lumbar, torso rotation l/r, leg extension, leg curl, upright row, chest press, biceps curl, triceps extension, leg press    See exercise log in patient folder for rate of exertion and repetitions completed.       Fitness Machine Education Key:  E=education on equipment initiated and further follow up and education needed  I=independent with   and exercise.  The patient:  Adjusts machines to his/her settings  Uses equipment levers, pins, weights safely  Maintains safe and correct posture while exercising  Moves through exercise with correct pace and control  Gets on and off equipment safely      Lumbar/Cervical Ext. E Torso Rotation E Leg Press E   Leg Extension E Seated Leg Curl E Chest Press X   Seated Row E Hip ADD E Hip ABD E   Triceps Extension X Bicep Curl X Other:      [x] Indicates exercise has been taught for home  Lumbar/Cervical Ext. [] Torso Rotation [] Leg Press []   Leg Extension [] Seated Leg Curl [] Chest Press []   Seated Row [] Hip ADD [] Hip ABD []   Triceps Extension [] Bicep Curl [] Other:        Assessment:   Patient tolerated Patient tolerated MedX Core Lumbar Strength and all other peripheral exercises without an increase in symptoms. Patient warmed up on treadmill for 5 minutes, stretched, and iced low back for 5 minutes after the workout.     Plan:  Continue with established plan of care towards wellness goals.     Health  : Camille Maki  11/11/2024       Yes